# Patient Record
Sex: FEMALE | Race: BLACK OR AFRICAN AMERICAN | NOT HISPANIC OR LATINO | Employment: STUDENT | URBAN - METROPOLITAN AREA
[De-identification: names, ages, dates, MRNs, and addresses within clinical notes are randomized per-mention and may not be internally consistent; named-entity substitution may affect disease eponyms.]

---
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
## 2024-11-07 ENCOUNTER — OFFICE VISIT (OUTPATIENT)
Dept: URGENT CARE | Facility: CLINIC | Age: 19
End: 2024-11-07
Payer: COMMERCIAL

## 2024-11-07 VITALS
SYSTOLIC BLOOD PRESSURE: 109 MMHG | WEIGHT: 122.56 LBS | OXYGEN SATURATION: 97 % | HEIGHT: 67 IN | RESPIRATION RATE: 18 BRPM | HEART RATE: 92 BPM | DIASTOLIC BLOOD PRESSURE: 73 MMHG | BODY MASS INDEX: 19.24 KG/M2 | TEMPERATURE: 99 F

## 2024-11-07 DIAGNOSIS — F43.9 STRESS AT HOME: ICD-10-CM

## 2024-11-07 DIAGNOSIS — R42 LIGHTHEADEDNESS: Primary | ICD-10-CM

## 2024-11-07 DIAGNOSIS — R53.83 OTHER FATIGUE: ICD-10-CM

## 2024-11-07 DIAGNOSIS — R51.9 ACUTE NONINTRACTABLE HEADACHE, UNSPECIFIED HEADACHE TYPE: ICD-10-CM

## 2024-11-07 LAB
B-HCG UR QL: NEGATIVE
BILIRUBIN, UA POC OHS: NEGATIVE
BLOOD, UA POC OHS: NEGATIVE
CLARITY, UA POC OHS: CLEAR
COLOR, UA POC OHS: YELLOW
CTP QC/QA: YES
CTP QC/QA: YES
GLUCOSE SERPL-MCNC: 89 MG/DL (ref 70–110)
GLUCOSE, UA POC OHS: NEGATIVE
KETONES, UA POC OHS: NEGATIVE
LEUKOCYTES, UA POC OHS: NEGATIVE
NITRITE, UA POC OHS: NEGATIVE
OHS QRS DURATION: 82 MS
OHS QTC CALCULATION: 392 MS
PH, UA POC OHS: 7
PROTEIN, UA POC OHS: ABNORMAL
SARS-COV-2 AG RESP QL IA.RAPID: NEGATIVE
SPECIFIC GRAVITY, UA POC OHS: 1.02
UROBILINOGEN, UA POC OHS: 1

## 2024-11-07 PROCEDURE — 93005 ELECTROCARDIOGRAM TRACING: CPT | Mod: S$GLB,,,

## 2024-11-07 PROCEDURE — 93010 ELECTROCARDIOGRAM REPORT: CPT | Mod: S$GLB,,, | Performed by: STUDENT IN AN ORGANIZED HEALTH CARE EDUCATION/TRAINING PROGRAM

## 2024-11-07 PROCEDURE — 82962 GLUCOSE BLOOD TEST: CPT | Mod: S$GLB,,,

## 2024-11-07 NOTE — LETTER
November 7, 2024      [facility] [address] [address]  Phone: [phone]  Fax: [phone]       Patient: Aminah Luu   YOB: 2005  Date of Visit: 11/07/2024    To Whom It May Concern:    Oscar Luu  was at [facility] on 11/07/2024. The patient may return to school on 11/7/24 with no restrictions. If you have any questions or concerns, or if I can be of further assistance, please do not hesitate to contact me.    Sincerely,    Norm Good NP

## 2024-11-07 NOTE — LETTER
November 7, 2024      [facility] [address] [address]  Phone: [phone]  Fax: [phone]       Patient: Aminah Luu   YOB: 2005  Date of Visit: 11/07/2024    To Whom It May Concern:    Oscar Luu  was at [facility] on 11/07/2024. The patient may return to school on 11/8/24 with no restrictions. If you have any questions or concerns, or if I can be of further assistance, please do not hesitate to contact me.    Sincerely,    Norm Good NP

## 2024-11-07 NOTE — PROGRESS NOTES
"Subjective:      Patient ID: Aminah Luu is a 18 y.o. female.    Vitals:  height is 5' 7" (1.702 m) and weight is 55.6 kg (122 lb 9.2 oz). Her oral temperature is 98.9 °F (37.2 °C). Her blood pressure is 109/73 and her pulse is 92. Her respiration is 18 and oxygen saturation is 97%.     Chief Complaint: Fever    19 y/o female c/o having intermittent chest tightness w/ SOB, fatigue, lightheadedness, dizziness x 4 days.  She states she has been stressed at school due to finals week.  She also reports she's been getting migraines but resolves after taking Topamax and sumatriptan. She currently denies CP or SOB during visit. Patient does not appear in acute distress during visit, has flat affect.         Fever   This is a new problem. The current episode started 3 days ago. The problem occurs constantly. The problem has been gradually worsening. She has not experienced a heat injury.The maximum temperature noted was 99 to 99.9 F. The temperature was taken using an oral thermometer. Associated symptoms include headaches. Pertinent negatives include no abdominal pain, chest pain, congestion, coughing, diarrhea, ear pain, muscle aches, nausea, not playful when afebrile, rash, sleepiness, sore throat, urinary pain, vomiting or wheezing. She has tried nothing for the symptoms. Her past medical history is not significant for immunocompromised state.       Constitution: Negative for activity change, appetite change, chills, sweating, fatigue, fever and unexpected weight change.   HENT:  Negative for ear pain, ear discharge, foreign body in ear, tinnitus, hearing loss, congestion, nosebleeds, foreign body in nose, postnasal drip, sinus pain, sinus pressure, sore throat, trouble swallowing and voice change.    Neck: Negative for neck pain, neck stiffness, painful lymph nodes and neck swelling.   Cardiovascular:  Negative for chest trauma, chest pain, leg swelling and palpitations.   Eyes:  Negative for eye trauma, foreign " body in eye, eye discharge, eye itching, eye pain, eye redness and photophobia.   Respiratory:  Negative for sleep apnea, chest tightness, cough, sputum production, bloody sputum, COPD, shortness of breath, stridor, wheezing and asthma.    Gastrointestinal:  Negative for abdominal trauma, abdominal pain, abdominal bloating, history of abdominal surgery, nausea, vomiting, constipation and diarrhea.   Endocrine: hair loss, cold intolerance and heat intolerance.   Genitourinary:  Negative for dysuria, frequency, urgency, urine decreased, flank pain, bladder incontinence and bed wetting.   Musculoskeletal:  Negative for pain, trauma, joint pain, joint swelling, abnormal ROM of joint, arthritis, gout and back pain.   Skin:  Negative for color change, pale, rash, wound, abrasion, laceration and lesion.   Allergic/Immunologic: Negative for environmental allergies, seasonal allergies, food allergies, eczema, asthma, immunocompromised state and recurrent sinus infections.   Neurological:  Positive for light-headedness and headaches. Negative for dizziness, history of vertigo, passing out, facial drooping, speech difficulty, coordination disturbances, loss of balance, history of migraines, disorientation, altered mental status, loss of consciousness and numbness.   Hematologic/Lymphatic: Negative for swollen lymph nodes, easy bruising/bleeding and trouble clotting. Does not bruise/bleed easily.   Psychiatric/Behavioral:  Negative for altered mental status, disorientation, confusion, agitation, nervous/anxious, sleep disturbance, hallucinations, homicidal ideas, suicidal ideas, self-injury and substance abuse. The patient is not nervous/anxious.       Objective:     Physical Exam   Constitutional: She is oriented to person, place, and time. She appears well-developed. She is cooperative.  Non-toxic appearance. She does not appear ill. No distress.   HENT:   Head: Normocephalic and atraumatic.   Ears:   Right Ear: Hearing,  tympanic membrane, external ear and ear canal normal.   Left Ear: Hearing, tympanic membrane, external ear and ear canal normal.   Nose: Nose normal. No mucosal edema, rhinorrhea or nasal deformity. No epistaxis. Right sinus exhibits no maxillary sinus tenderness and no frontal sinus tenderness. Left sinus exhibits no maxillary sinus tenderness and no frontal sinus tenderness.   Mouth/Throat: Uvula is midline, oropharynx is clear and moist and mucous membranes are normal. Mucous membranes are moist. No trismus in the jaw. Normal dentition. No uvula swelling. No oropharyngeal exudate, posterior oropharyngeal edema or posterior oropharyngeal erythema. Oropharynx is clear.   Eyes: Conjunctivae and lids are normal. No scleral icterus.   Neck: Trachea normal and phonation normal. Neck supple. No edema present. No erythema present. No neck rigidity present.   Cardiovascular: Normal rate, regular rhythm, normal heart sounds and normal pulses.   Pulmonary/Chest: Effort normal and breath sounds normal. No stridor. No respiratory distress. She has no decreased breath sounds. She has no wheezes. She has no rhonchi. She has no rales. She exhibits no tenderness.   Abdominal: Normal appearance and bowel sounds are normal. She exhibits no distension and no mass. Soft. There is no abdominal tenderness. There is no rebound, no guarding, no left CVA tenderness and no right CVA tenderness. No hernia.   Musculoskeletal: Normal range of motion.         General: No deformity. Normal range of motion.   Neurological: no focal deficit. She is alert, oriented to person, place, and time and at baseline. She has normal motor skills, normal sensation, normal strength and intact cranial nerves (2-12). She displays no weakness and normal reflexes. No cranial nerve deficit or sensory deficit. She exhibits normal muscle tone. Gait and coordination normal. Coordination and gait normal. GCS eye subscore is 4. GCS verbal subscore is 5. GCS motor  subscore is 6.   Skin: Skin is warm, dry, intact, not diaphoretic and not pale.   Psychiatric: Her speech is normal and behavior is normal. Judgment and thought content normal. Her mood appears not anxious. She has a flat affect.   Nursing note and vitals reviewed.    Results for orders placed or performed in visit on 11/07/24   SARS Coronavirus 2 Antigen, POCT Manual Read    Collection Time: 11/07/24  1:30 PM   Result Value Ref Range    SARS Coronavirus 2 Antigen Negative Negative     Acceptable Yes    POCT Glucose, Hand-Held Device    Collection Time: 11/07/24  1:46 PM   Result Value Ref Range    POC Glucose 89 70 - 110 MG/DL   IN OFFICE EKG 12-LEAD (to [place])    Collection Time: 11/07/24  1:51 PM   Result Value Ref Range    QRS Duration 82 ms    [facility] QTC Calculation 392 ms   POCT Urinalysis(Instrument)    Collection Time: 11/07/24  2:13 PM   Result Value Ref Range    Color, POC UA Yellow Yellow, Straw, Colorless    Clarity, POC UA Clear Clear    Glucose, POC UA Negative Negative    Bilirubin, POC UA Negative Negative    Ketones, POC UA Negative Negative    Spec Grav POC UA 1.025 1.005 - 1.030    Blood, POC UA Negative Negative    pH, POC UA 7.0 5.0 - 8.0    Protein, POC UA Trace (A) Negative    Urobilinogen, POC UA 1.0 <=1.0    Nitrite, POC UA Negative Negative    WBC, POC UA Negative Negative   POCT urine pregnancy    Collection Time: 11/07/24  2:13 PM   Result Value Ref Range    POC Preg Test, Ur Negative Negative     Acceptable Yes       Assessment:     1. Lightheadedness    2. Acute nonintractable headache, unspecified headache type    3. Other fatigue    4. Stress at home        Plan:       Lightheadedness  -     IN OFFICE EKG 12-LEAD (to [facility])  -     Orthostatic vital signs  -     POCT Glucose, Hand-Held Device    Acute nonintractable headache, unspecified headache type  -     SARS Coronavirus 2 Antigen, POCT Manual Read    Other fatigue  -     Orthostatic vital signs  -     POCT  Glucose, Hand-Held Device  -     POCT Urinalysis(Instrument)  -     POCT urine pregnancy    Stress at home          Medical Decision Making:   Urgent Care Management:  [place] test negative. No nystagmus. Patient denies dizziness or vertigo.   Orthostatic Hypertension:  Laying- 108/55  HR: 70  Sitting- 110/58  HR: 83  Standin/64  HR: 107  Patient denies any dizziness or vertigo during exam.    EKG: NSR.   Patient symptoms more likely due to stress/anxiety due to school. Instructed strict ER precautions for persisting or worsening symptoms. Patient verbalize understanding. Patient does not appear in distress during visit. She displays a flat affect.       Additional MDM:     Heart Failure Score:   COPD = No

## 2024-11-07 NOTE — PATIENT INSTRUCTIONS
Your tests are normal today. If you develop persisting or worsening symptoms please follow up. Rest and increase fluids.    When do I need to get emergency help?   Call for an ambulance right away if:  You have new weakness in your arms or legs.  You develop new trouble speaking, swallowing, seeing, or hearing.  You have chest pain, an irregular heartbeat, or trouble breathing.  You have a seizure.  You become unable to walk or stand because of your dizziness.  When do I need to call the doctor?   Your dizziness continues for a long time or gets worse.  You have new or worsening symptoms.  - Rest.    - Drink plenty of fluids.    - Acetaminophen (tylenol) or Ibuprofen (advil,motrin) as directed as needed for fever/pain. Avoid tylenol if you have a history of liver disease. Do not take ibuprofen if you have a history of GI bleeding, kidney disease, or if you take blood thinners.     - Follow up with your PCP or specialty clinic as directed in the next 1-2 weeks if not improved or as needed.  You can call [phone] to schedule an appointment with the appropriate provider.    - Go to the ER or seek medical attention immediately if you develop new or worsening symptoms.     - You must understand that you have received an Urgent Care treatment only and that you may be released before all of your medical problems are known or treated.   - You, the patient, will arrange for follow up care as instructed.   - If your condition worsens or fails to improve we recommend that you receive another evaluation at the ER immediately or contact your PCP to discuss your concerns or return here.